# Patient Record
Sex: MALE | Race: WHITE | NOT HISPANIC OR LATINO | URBAN - NONMETROPOLITAN AREA
[De-identification: names, ages, dates, MRNs, and addresses within clinical notes are randomized per-mention and may not be internally consistent; named-entity substitution may affect disease eponyms.]

---

## 2019-06-03 ENCOUNTER — IMPORTED ENCOUNTER (OUTPATIENT)
Dept: URBAN - NONMETROPOLITAN AREA CLINIC 1 | Facility: CLINIC | Age: 84
End: 2019-06-03

## 2019-06-03 PROBLEM — H26.499: Noted: 2018-12-03

## 2019-06-03 PROBLEM — D31.32: Noted: 2018-12-03

## 2019-06-03 PROBLEM — Z96.1: Noted: 2018-12-03

## 2019-06-03 PROBLEM — H43.812: Noted: 2018-12-03

## 2019-06-03 PROBLEM — H01.021: Noted: 2019-06-03

## 2019-06-03 PROBLEM — H01.024: Noted: 2019-06-03

## 2019-06-03 PROCEDURE — 92014 COMPRE OPH EXAM EST PT 1/>: CPT

## 2019-06-03 PROCEDURE — 92250 FUNDUS PHOTOGRAPHY W/I&R: CPT

## 2019-06-03 NOTE — PATIENT DISCUSSION
ERM OS-  Educated patient on condition-  Optos done today stable findings OU-  OCT done previously shows ERM OS but stable from previous -  Continue to use [employer] daily call or come in ASAP if changes in vision are noted from today-  Continue to monitorP/C IOL (TORIC) OS with PCO OS-  Educated patient on condition-  Patient is stable and doing well at this time-  PCO noted OS no treatment needed at this time -  Continue to monitorShrunken Cataract OD:-  Educated patient on condition-  Appears stable no changes noted. -  Continue to monitorChor nevus OS-  Educated patient on condition-  Appears stable no changes in shape/size-  Continue to monitorHx VTX OD PVD OS-  Discussed findings of exam in detail with the patient.-  Stable findings with no holes tears detachments noted OU. -  The risk of retinal detachment in patients with PVDs was discussed with the patient and the warning signs of retinal detachment were carefully reviewed with the patient. -  The patient was warned to return to the office or contact the ophthalmologist on call immediately if they experience signs of retinal detachment or changes in vision noted from today. -  Continue to monitor.  Blepharitis OU- Discussed diagnosis in detail with patient- Recommend patient using [employer] baby shampoo to scrub lid daily- Continue to monitorDermatochalasis OU-  Discussed findings in detail w/ pt today-  No superior field of vision complaint noted at this time-  Continue to monitor; 's Notes: MR 12/3/18DFE  6/3/19Optos  6/3/19OCT mac 12/3/18

## 2022-04-09 ASSESSMENT — TONOMETRY
OD_IOP_MMHG: 02
OS_IOP_MMHG: 09

## 2022-04-09 ASSESSMENT — VISUAL ACUITY: OS_SC: 20/50+
